# Patient Record
Sex: MALE | HISPANIC OR LATINO | Employment: PART TIME | URBAN - METROPOLITAN AREA
[De-identification: names, ages, dates, MRNs, and addresses within clinical notes are randomized per-mention and may not be internally consistent; named-entity substitution may affect disease eponyms.]

---

## 2022-05-02 ENCOUNTER — OFFICE VISIT (OUTPATIENT)
Dept: FAMILY MEDICINE | Facility: CLINIC | Age: 22
End: 2022-05-02
Payer: COMMERCIAL

## 2022-05-02 ENCOUNTER — ANCILLARY PROCEDURE (OUTPATIENT)
Dept: GENERAL RADIOLOGY | Facility: CLINIC | Age: 22
End: 2022-05-02
Attending: PHYSICIAN ASSISTANT
Payer: COMMERCIAL

## 2022-05-02 VITALS
HEART RATE: 64 BPM | WEIGHT: 222 LBS | RESPIRATION RATE: 18 BRPM | OXYGEN SATURATION: 99 % | TEMPERATURE: 98.3 F | SYSTOLIC BLOOD PRESSURE: 146 MMHG | DIASTOLIC BLOOD PRESSURE: 84 MMHG

## 2022-05-02 DIAGNOSIS — S63.502A SPRAIN OF LEFT WRIST, INITIAL ENCOUNTER: ICD-10-CM

## 2022-05-02 DIAGNOSIS — S69.92XA INJURY OF LEFT WRIST, INITIAL ENCOUNTER: ICD-10-CM

## 2022-05-02 DIAGNOSIS — S69.92XA INJURY OF LEFT WRIST, INITIAL ENCOUNTER: Primary | ICD-10-CM

## 2022-05-02 PROCEDURE — 73110 X-RAY EXAM OF WRIST: CPT | Mod: TC | Performed by: RADIOLOGY

## 2022-05-02 PROCEDURE — 99203 OFFICE O/P NEW LOW 30 MIN: CPT | Performed by: PHYSICIAN ASSISTANT

## 2022-05-02 NOTE — PROGRESS NOTES
Chief Complaint   Patient presents with     Wrist Pain     Had michael mora on 4/30/2022 since then has had pain left wrist       ASSESSMENT/PLAN:  Nick was seen today for wrist pain.    Diagnoses and all orders for this visit:    Injury of left wrist, initial encounter  -     XR Wrist Left G/E 3 Views; Future  -     Physical Therapy Referral; Future  -     Wrist/Arm/Hand Supplies Order for DME - ONLY FOR DME    Sprain of left wrist, initial encounter  -     Physical Therapy Referral; Future  -     Wrist/Arm/Hand Supplies Order for DME - ONLY FOR DME    Exam consistent with a sprain of the wrist likely ligamentous injury.  Wrist splint given today.  Recommend continued ice for the next few days followed by activity modification.  Ibuprofen, Voltaren gel as needed for pain    Kahlil Myers PA-C      SUBJECTIVE:  Nick is a 21 year old male who presents to urgent care with left wrist pain.  2 days ago he had a [employer] tournament and feels he injured his wrist during a match.  He had some clicking that he noticed in the left wrist but continued to compete through the pain.  It was more or less okay during the match but significantly more sore afterwards.  The next day he woke up and it was significantly more swollen and tender.  The pain worsened and continues to have swelling and tenderness.  No significant numbness or tingling.  No breaks in skin    ROS: Pertinent ROS neg other than the symptoms noted above in the HPI.     OBJECTIVE:  BP (!) 146/84   Pulse 64   Temp 98.3  F (36.8  C) (Oral)   Resp 18   Wt 100.7 kg (222 lb)   SpO2 99%    GENERAL: healthy, alert and no distress  MS: Full range of motion of the left wrist, swelling noted mainly on the radial to mid aspect of the wrist.  No erythema or breaks in skin.  Focal tenderness of the mid carpal bones and some diffuse snuffbox tenderness.  SKIN: no suspicious lesions or rashes    DIAGNOSTICS.  Diabetes ascites critical  Xray - Reviewed and  interpreted by me.  No acute bony abnormality noted  No results found for any visits on 05/02/22.     No current outpatient medications on file.     No current facility-administered medications for this visit.      There is no problem list on file for this patient.     No past medical history on file.  No past surgical history on file.  No family history on file.  Social History     Tobacco Use     Smoking status: Never Smoker     Smokeless tobacco: Never Used   Substance Use Topics     Alcohol use: Not on file              The plan of care was discussed with the patient. They understand and agree with the course of treatment prescribed. A printed summary was given including instructions and medications.  The use of Dragon/FTBpro dictation services may have been used to construct the content in this note; any grammatical or spelling errors are non-intentional. Please contact the author of this note directly if you are in need of any clarification.

## 2022-05-03 DIAGNOSIS — S63.502A SPRAIN OF LEFT WRIST, INITIAL ENCOUNTER: ICD-10-CM

## 2022-05-03 DIAGNOSIS — S69.92XA INJURY OF LEFT WRIST, INITIAL ENCOUNTER: Primary | ICD-10-CM

## 2022-10-02 ENCOUNTER — HOSPITAL ENCOUNTER (EMERGENCY)
Facility: CLINIC | Age: 22
Discharge: HOME | End: 2022-10-02
Attending: PHYSICIAN ASSISTANT | Admitting: PHYSICIAN ASSISTANT
Payer: COMMERCIAL

## 2022-10-02 VITALS
RESPIRATION RATE: 16 BRPM | HEART RATE: 65 BPM | SYSTOLIC BLOOD PRESSURE: 147 MMHG | WEIGHT: 220 LBS | OXYGEN SATURATION: 99 % | BODY MASS INDEX: 28.23 KG/M2 | HEIGHT: 74 IN | DIASTOLIC BLOOD PRESSURE: 78 MMHG | TEMPERATURE: 98.2 F

## 2022-10-02 DIAGNOSIS — H10.9 BACTERIAL CONJUNCTIVITIS OF RIGHT EYE: ICD-10-CM

## 2022-10-02 PROCEDURE — G0463 HOSPITAL OUTPT CLINIC VISIT: HCPCS | Performed by: PHYSICIAN ASSISTANT

## 2022-10-02 PROCEDURE — 99213 OFFICE O/P EST LOW 20 MIN: CPT | Performed by: PHYSICIAN ASSISTANT

## 2022-10-02 RX ORDER — TOBRAMYCIN 3 MG/ML
1-2 SOLUTION/ DROPS OPHTHALMIC 3 TIMES DAILY
Qty: 3 ML | Refills: 0 | Status: SHIPPED | OUTPATIENT
Start: 2022-10-02 | End: 2022-10-09

## 2022-10-02 ASSESSMENT — ENCOUNTER SYMPTOMS
PHOTOPHOBIA: 1
NEUROLOGICAL NEGATIVE: 1
EYE PAIN: 0
EYE DISCHARGE: 1
HEADACHES: 0
CARDIOVASCULAR NEGATIVE: 1
EYE ITCHING: 1
GASTROINTESTINAL NEGATIVE: 1
CONSTITUTIONAL NEGATIVE: 1
EYE REDNESS: 1
RESPIRATORY NEGATIVE: 1

## 2022-10-02 ASSESSMENT — VISUAL ACUITY: OU: 1

## 2022-10-02 NOTE — LETTER
October 2, 2022      To Whom It May Concern:      Nick Abdul was seen in our Emergency Department today, 10/02/22.  Please excuse patient from work tomorrow.  Patient can return to work on 10-4-2022.      Sincerely,        Briana Vanegas PA-C

## 2022-10-02 NOTE — DISCHARGE INSTRUCTIONS
Do not wear contacts for the next 7 to 10 days.  Throw away the contacts you were using yesterday and today    Use antibiotic eyedrops as directed.  Treat both eyes    Go to the ophthalmologist or return to the emergency department if eye pain, visual changes occur.    Contagious for 24 hours on antibiotics.   English

## 2022-10-02 NOTE — ED PROVIDER NOTES
"  History   No chief complaint on file.    [facility] [place]  Nick Abdul is a 21 year old male who presents today with right eye redness, drainage, and itching/irritation that started yesterday. Patient states he does wear contacts, but does not sleep with them in and he took them out when symptoms started. He denies blurry or double vision, painful eye movement, redness around the eyes, injury, fevers or chills.  He states that he has some crusting and goopy and this morning that has been present throughout the day.    Allergies:  No Known Allergies    Problem List:    There are no problems to display for this patient.       Past Medical History:    No past medical history on file.    Past Surgical History:    No past surgical history on file.    Family History:    No family history on file.    Social History:  Marital Status:  Single [1]  Social History     Tobacco Use     Smoking status: Never Smoker     Smokeless tobacco: Never Used        Medications:    tobramycin (TOBREX) 0.3 % ophthalmic solution          Review of Systems   Constitutional: Negative.    HENT: Negative.    Eyes: Positive for photophobia (slight irritation with sunlight in right eye), discharge, redness and itching. Negative for pain and visual disturbance.   Respiratory: Negative.    Cardiovascular: Negative.    Gastrointestinal: Negative.    Skin: Negative.    Neurological: Negative.  Negative for headaches.   All other systems reviewed and are negative.      Physical Exam   BP: (!) 147/78  Pulse: 65  Temp: 98.2  F (36.8  C)  Resp: 16  Height: 188 cm (6' 2\")  Weight: 99.8 kg (220 lb)  SpO2: 99 %      Physical Exam  Vitals and nursing note reviewed.   Constitutional:       General: He is not in acute distress.     Appearance: Normal appearance. He is normal weight. He is not ill-appearing or toxic-appearing.   HENT:      Head: Normocephalic and atraumatic.   Eyes:      General: Lids are normal. Vision grossly intact. Gaze aligned appropriately. No " scleral icterus.        Right eye: Discharge present. No hordeolum.         Left eye: No discharge or hordeolum.      Extraocular Movements: Extraocular movements intact.      Right eye: Normal extraocular motion and no nystagmus.      Left eye: Normal extraocular motion and no nystagmus.      Conjunctiva/sclera:      Right eye: Right conjunctiva is injected. Exudate present. No chemosis or hemorrhage.     Left eye: Left conjunctiva is not injected. No chemosis, exudate or hemorrhage.     Pupils: Pupils are equal, round, and reactive to light.      Comments: Right injected conjunctiva    Skin:     General: Skin is warm.      Capillary Refill: Capillary refill takes less than 2 seconds.      Findings: No bruising, erythema or rash.   Neurological:      General: No focal deficit present.      Mental Status: He is alert and oriented to person, place, and time.   Psychiatric:         Mood and Affect: Mood normal.         Behavior: Behavior normal.         Thought Content: Thought content normal.         Judgment: Judgment normal.         ED Course                 Procedures             Critical Care time:  none               No results found for this or any previous visit (from the past 24 hour(s)).    Medications - No data to display    Assessments & Plan (with Medical Decision Making)     I have reviewed the nursing notes.    I have reviewed the findings, diagnosis, plan and need for follow up with the patient.    Nick Abdul is a 21 year old male who presents today with right eye redness, drainage, and itching/irritation that started yesterday. Patient states he does wear contacts, but does not sleep with them in and he took them out when symptoms started. He denies blurry or double vision, painful eye movement, redness around the eyes, injury, fevers or chills.  He states that he has some crusting and goopy and this morning that has been present throughout the day.    See exam findings above.  Exam findings  consistent with bacterial conjunctivitis.  No indication or need for further slit-lamp since patient does not have any deep eye pain or concern for conjunctival ulceration or abrasion due to history and exam findings.  Patient sent home with prescription tobramycin eyedrops and informed to treat both eyes and that he is contagious for 24 hours on antibiotic eyedrops.  He also was informed to throw away the contacts that he was using and not to wear contacts for the neck 7 to 10 days.  Patient in agreement plan and discharged in stable condition.  Patient follow-up with eye specialist or go to the emergency department if symptoms worsen or change these were discussed and given on discharge paperwork.    Discharge Medication List as of 10/2/2022  2:26 PM      START taking these medications    Details   tobramycin (TOBREX) 0.3 % ophthalmic solution Place 1-2 drops into both eyes 3 times daily for 7 days, Disp-3 mL, R-0, E-Prescribe             Final diagnoses:   Bacterial conjunctivitis of right eye       10/2/2022   [facility] EMERGENCY DEPT

## 2024-03-19 ENCOUNTER — OFFICE VISIT (OUTPATIENT)
Dept: FAMILY MEDICINE | Facility: CLINIC | Age: 24
End: 2024-03-19
Payer: COMMERCIAL

## 2024-03-19 VITALS
BODY MASS INDEX: 32.1 KG/M2 | SYSTOLIC BLOOD PRESSURE: 104 MMHG | OXYGEN SATURATION: 98 % | WEIGHT: 250 LBS | DIASTOLIC BLOOD PRESSURE: 68 MMHG | HEART RATE: 72 BPM | TEMPERATURE: 97.8 F

## 2024-03-19 DIAGNOSIS — Z11.59 NEED FOR HEPATITIS C SCREENING TEST: ICD-10-CM

## 2024-03-19 DIAGNOSIS — Z11.4 SCREENING FOR HIV (HUMAN IMMUNODEFICIENCY VIRUS): Primary | ICD-10-CM

## 2024-03-19 DIAGNOSIS — H61.23 BILATERAL IMPACTED CERUMEN: ICD-10-CM

## 2024-03-19 PROCEDURE — 69209 REMOVE IMPACTED EAR WAX UNI: CPT | Mod: 50 | Performed by: FAMILY MEDICINE

## 2024-03-19 NOTE — PROGRESS NOTES
Assessment & Plan       (H61.23) Bilateral impacted cerumen  Comment: Bilaterally impacted cerumen no more prominent on the right than the left  Plan: REMOVE IMPACTED CERUMEN             PLAN:  1.  Bilateral ceruminosis removed by clinical assistant with usual method of water and hydrogen peroxide irrigation, and otherwise follow-up as needed.                  Xavier Romero is a 23 year old, presenting for the following health issues:  Cerumen Impaction (Right Ear)        3/19/2024     9:06 AM   Additional Questions   Roomed by Julienne BARR     History of Present Illness       Reason for visit:  Ear Irritation  Symptom onset:  3-7 days ago  Symptoms include:  Muffled Hearing  Symptom intensity:  Moderate  Symptom progression:  Staying the same  Had these symptoms before:  No    He eats 2-3 servings of fruits and vegetables daily.He consumes 0 sweetened beverage(s) daily.He exercises with enough effort to increase his heart rate 30 to 60 minutes per day.  He exercises with enough effort to increase his heart rate 5 days per week.   He is taking medications regularly.     Patient has a history of earwax issues he actually uses Q-tips fairly regularly and just purchased some over-the-counter eardrops yesterday.  He does note that he is not hearing as well particularly out of the right ear he does have bilateral ceruminosis though I can see most of the tympanic membrane but he has some dried earwax in the distal ear canal on the right that I do think would benefit from overall.                        Objective    /68   Pulse 72   Temp 97.8  F (36.6  C)   Wt 113.4 kg (250 lb)   SpO2 98%   BMI 32.10 kg/m    Body mass index is 32.1 kg/m .  Physical Exam   ENT examination there is partially obstructing bilateral ceruminosis though more prominent on the right than on the left                Signed Electronically by: Mason Yan MD